# Patient Record
Sex: FEMALE | Race: ASIAN | NOT HISPANIC OR LATINO | URBAN - METROPOLITAN AREA
[De-identification: names, ages, dates, MRNs, and addresses within clinical notes are randomized per-mention and may not be internally consistent; named-entity substitution may affect disease eponyms.]

---

## 2017-06-07 ENCOUNTER — INPATIENT (INPATIENT)
Facility: HOSPITAL | Age: 77
LOS: 2 days | Discharge: HOME HEALTH | End: 2017-06-10
Attending: INTERNAL MEDICINE | Admitting: INTERNAL MEDICINE
Payer: MEDICARE

## 2017-06-07 VITALS
HEART RATE: 106 BPM | SYSTOLIC BLOOD PRESSURE: 119 MMHG | RESPIRATION RATE: 16 BRPM | DIASTOLIC BLOOD PRESSURE: 71 MMHG | OXYGEN SATURATION: 98 % | TEMPERATURE: 98 F

## 2017-06-07 DIAGNOSIS — E11.9 TYPE 2 DIABETES MELLITUS WITHOUT COMPLICATIONS: ICD-10-CM

## 2017-06-07 DIAGNOSIS — Z29.9 ENCOUNTER FOR PROPHYLACTIC MEASURES, UNSPECIFIED: ICD-10-CM

## 2017-06-07 DIAGNOSIS — D72.829 ELEVATED WHITE BLOOD CELL COUNT, UNSPECIFIED: ICD-10-CM

## 2017-06-07 DIAGNOSIS — R63.8 OTHER SYMPTOMS AND SIGNS CONCERNING FOOD AND FLUID INTAKE: ICD-10-CM

## 2017-06-07 DIAGNOSIS — I21.3 ST ELEVATION (STEMI) MYOCARDIAL INFARCTION OF UNSPECIFIED SITE: ICD-10-CM

## 2017-06-07 DIAGNOSIS — I10 ESSENTIAL (PRIMARY) HYPERTENSION: ICD-10-CM

## 2017-06-07 DIAGNOSIS — E87.1 HYPO-OSMOLALITY AND HYPONATREMIA: ICD-10-CM

## 2017-06-07 DIAGNOSIS — R74.0 NONSPECIFIC ELEVATION OF LEVELS OF TRANSAMINASE AND LACTIC ACID DEHYDROGENASE [LDH]: ICD-10-CM

## 2017-06-07 DIAGNOSIS — E78.5 HYPERLIPIDEMIA, UNSPECIFIED: ICD-10-CM

## 2017-06-07 LAB
ALBUMIN SERPL ELPH-MCNC: 4 G/DL — SIGNIFICANT CHANGE UP (ref 3.3–5)
ALP SERPL-CCNC: 81 U/L — SIGNIFICANT CHANGE UP (ref 40–120)
ALT FLD-CCNC: 26 U/L — SIGNIFICANT CHANGE UP (ref 4–33)
APTT BLD: 31.7 SEC — SIGNIFICANT CHANGE UP (ref 27.5–37.4)
APTT BLD: 55.3 SEC — HIGH (ref 27.5–37.4)
AST SERPL-CCNC: 89 U/L — HIGH (ref 4–32)
BASOPHILS # BLD AUTO: 0.02 K/UL — SIGNIFICANT CHANGE UP (ref 0–0.2)
BASOPHILS NFR BLD AUTO: 0.1 % — SIGNIFICANT CHANGE UP (ref 0–2)
BILIRUB SERPL-MCNC: 0.7 MG/DL — SIGNIFICANT CHANGE UP (ref 0.2–1.2)
BUN SERPL-MCNC: 15 MG/DL — SIGNIFICANT CHANGE UP (ref 7–23)
CALCIUM SERPL-MCNC: 9 MG/DL — SIGNIFICANT CHANGE UP (ref 8.4–10.5)
CHLORIDE SERPL-SCNC: 89 MMOL/L — LOW (ref 98–107)
CK MB BLD-MCNC: 41.34 NG/ML — HIGH (ref 1–4.7)
CK MB BLD-MCNC: 6.5 — HIGH (ref 0–2.5)
CK SERPL-CCNC: 465 U/L — HIGH (ref 25–170)
CK SERPL-CCNC: 635 U/L — HIGH (ref 25–170)
CO2 SERPL-SCNC: 20 MMOL/L — LOW (ref 22–31)
CREAT SERPL-MCNC: 0.99 MG/DL — SIGNIFICANT CHANGE UP (ref 0.5–1.3)
EOSINOPHIL # BLD AUTO: 0 K/UL — SIGNIFICANT CHANGE UP (ref 0–0.5)
EOSINOPHIL NFR BLD AUTO: 0 % — SIGNIFICANT CHANGE UP (ref 0–6)
GLUCOSE SERPL-MCNC: 244 MG/DL — HIGH (ref 70–99)
HBA1C BLD-MCNC: 6.6 % — HIGH (ref 4–5.6)
HCT VFR BLD CALC: 41.8 % — SIGNIFICANT CHANGE UP (ref 34.5–45)
HGB BLD-MCNC: 14.4 G/DL — SIGNIFICANT CHANGE UP (ref 11.5–15.5)
IMM GRANULOCYTES NFR BLD AUTO: 0.4 % — SIGNIFICANT CHANGE UP (ref 0–1.5)
INR BLD: 0.97 — SIGNIFICANT CHANGE UP (ref 0.88–1.17)
LYMPHOCYTES # BLD AUTO: 15 % — SIGNIFICANT CHANGE UP (ref 13–44)
LYMPHOCYTES # BLD AUTO: 2.89 K/UL — SIGNIFICANT CHANGE UP (ref 1–3.3)
MCHC RBC-ENTMCNC: 31.4 PG — SIGNIFICANT CHANGE UP (ref 27–34)
MCHC RBC-ENTMCNC: 34.4 % — SIGNIFICANT CHANGE UP (ref 32–36)
MCV RBC AUTO: 91.1 FL — SIGNIFICANT CHANGE UP (ref 80–100)
MONOCYTES # BLD AUTO: 1.3 K/UL — HIGH (ref 0–0.9)
MONOCYTES NFR BLD AUTO: 6.8 % — SIGNIFICANT CHANGE UP (ref 2–14)
NEUTROPHILS # BLD AUTO: 14.94 K/UL — HIGH (ref 1.8–7.4)
NEUTROPHILS NFR BLD AUTO: 77.7 % — HIGH (ref 43–77)
NT-PROBNP SERPL-SCNC: 8451 PG/ML — SIGNIFICANT CHANGE UP
PLATELET # BLD AUTO: 324 K/UL — SIGNIFICANT CHANGE UP (ref 150–400)
PMV BLD: 8.8 FL — SIGNIFICANT CHANGE UP (ref 7–13)
POTASSIUM SERPL-MCNC: 3.6 MMOL/L — SIGNIFICANT CHANGE UP (ref 3.5–5.3)
POTASSIUM SERPL-SCNC: 3.6 MMOL/L — SIGNIFICANT CHANGE UP (ref 3.5–5.3)
PROT SERPL-MCNC: 7.7 G/DL — SIGNIFICANT CHANGE UP (ref 6–8.3)
PROTHROM AB SERPL-ACNC: 10.9 SEC — SIGNIFICANT CHANGE UP (ref 9.8–13.1)
RBC # BLD: 4.59 M/UL — SIGNIFICANT CHANGE UP (ref 3.8–5.2)
RBC # FLD: 12.7 % — SIGNIFICANT CHANGE UP (ref 10.3–14.5)
SODIUM SERPL-SCNC: 129 MMOL/L — LOW (ref 135–145)
TROPONIN T SERPL-MCNC: 1.06 NG/ML — HIGH (ref 0–0.06)
TROPONIN T SERPL-MCNC: 1.46 NG/ML — HIGH (ref 0–0.06)
TSH SERPL-MCNC: 2.72 UIU/ML — SIGNIFICANT CHANGE UP (ref 0.27–4.2)
WBC # BLD: 19.22 K/UL — HIGH (ref 3.8–10.5)
WBC # FLD AUTO: 19.22 K/UL — HIGH (ref 3.8–10.5)

## 2017-06-07 PROCEDURE — 93306 TTE W/DOPPLER COMPLETE: CPT | Mod: 26

## 2017-06-07 PROCEDURE — 71010: CPT | Mod: 26

## 2017-06-07 RX ORDER — HEPARIN SODIUM 5000 [USP'U]/ML
5000 INJECTION INTRAVENOUS; SUBCUTANEOUS EVERY 8 HOURS
Qty: 0 | Refills: 0 | Status: DISCONTINUED | OUTPATIENT
Start: 2017-06-07 | End: 2017-06-07

## 2017-06-07 RX ORDER — CHOLECALCIFEROL (VITAMIN D3) 125 MCG
1 CAPSULE ORAL
Qty: 0 | Refills: 0 | COMMUNITY

## 2017-06-07 RX ORDER — INSULIN LISPRO 100/ML
VIAL (ML) SUBCUTANEOUS
Qty: 0 | Refills: 0 | Status: DISCONTINUED | OUTPATIENT
Start: 2017-06-07 | End: 2017-06-10

## 2017-06-07 RX ORDER — DEXTROSE 50 % IN WATER 50 %
12.5 SYRINGE (ML) INTRAVENOUS ONCE
Qty: 0 | Refills: 0 | Status: DISCONTINUED | OUTPATIENT
Start: 2017-06-07 | End: 2017-06-10

## 2017-06-07 RX ORDER — VALSARTAN 80 MG/1
1 TABLET ORAL
Qty: 0 | Refills: 0 | COMMUNITY

## 2017-06-07 RX ORDER — TICAGRELOR 90 MG/1
180 TABLET ORAL ONCE
Qty: 0 | Refills: 0 | Status: COMPLETED | OUTPATIENT
Start: 2017-06-07 | End: 2017-06-07

## 2017-06-07 RX ORDER — DEXTROSE 50 % IN WATER 50 %
25 SYRINGE (ML) INTRAVENOUS ONCE
Qty: 0 | Refills: 0 | Status: DISCONTINUED | OUTPATIENT
Start: 2017-06-07 | End: 2017-06-10

## 2017-06-07 RX ORDER — METFORMIN HYDROCHLORIDE 850 MG/1
1 TABLET ORAL
Qty: 0 | Refills: 0 | COMMUNITY

## 2017-06-07 RX ORDER — ASPIRIN/CALCIUM CARB/MAGNESIUM 324 MG
325 TABLET ORAL ONCE
Qty: 0 | Refills: 0 | Status: COMPLETED | OUTPATIENT
Start: 2017-06-07 | End: 2017-06-07

## 2017-06-07 RX ORDER — ASPIRIN/CALCIUM CARB/MAGNESIUM 324 MG
81 TABLET ORAL DAILY
Qty: 0 | Refills: 0 | Status: DISCONTINUED | OUTPATIENT
Start: 2017-06-08 | End: 2017-06-10

## 2017-06-07 RX ORDER — FUROSEMIDE 40 MG
20 TABLET ORAL ONCE
Qty: 0 | Refills: 0 | Status: COMPLETED | OUTPATIENT
Start: 2017-06-07 | End: 2017-06-07

## 2017-06-07 RX ORDER — TICAGRELOR 90 MG/1
90 TABLET ORAL
Qty: 0 | Refills: 0 | Status: DISCONTINUED | OUTPATIENT
Start: 2017-06-08 | End: 2017-06-08

## 2017-06-07 RX ORDER — HEPARIN SODIUM 5000 [USP'U]/ML
3000 INJECTION INTRAVENOUS; SUBCUTANEOUS ONCE
Qty: 0 | Refills: 0 | Status: COMPLETED | OUTPATIENT
Start: 2017-06-07 | End: 2017-06-07

## 2017-06-07 RX ORDER — HEPARIN SODIUM 5000 [USP'U]/ML
800 INJECTION INTRAVENOUS; SUBCUTANEOUS
Qty: 25000 | Refills: 0 | Status: DISCONTINUED | OUTPATIENT
Start: 2017-06-07 | End: 2017-06-08

## 2017-06-07 RX ORDER — INSULIN LISPRO 100/ML
VIAL (ML) SUBCUTANEOUS AT BEDTIME
Qty: 0 | Refills: 0 | Status: DISCONTINUED | OUTPATIENT
Start: 2017-06-07 | End: 2017-06-10

## 2017-06-07 RX ORDER — ATORVASTATIN CALCIUM 80 MG/1
80 TABLET, FILM COATED ORAL AT BEDTIME
Qty: 0 | Refills: 0 | Status: DISCONTINUED | OUTPATIENT
Start: 2017-06-07 | End: 2017-06-08

## 2017-06-07 RX ORDER — POTASSIUM CHLORIDE 20 MEQ
40 PACKET (EA) ORAL ONCE
Qty: 0 | Refills: 0 | Status: COMPLETED | OUTPATIENT
Start: 2017-06-07 | End: 2017-06-07

## 2017-06-07 RX ADMIN — ATORVASTATIN CALCIUM 80 MILLIGRAM(S): 80 TABLET, FILM COATED ORAL at 21:35

## 2017-06-07 RX ADMIN — Medication 40 MILLIEQUIVALENT(S): at 18:37

## 2017-06-07 RX ADMIN — Medication 5: at 16:33

## 2017-06-07 RX ADMIN — TICAGRELOR 180 MILLIGRAM(S): 90 TABLET ORAL at 14:32

## 2017-06-07 RX ADMIN — HEPARIN SODIUM 3000 UNIT(S): 5000 INJECTION INTRAVENOUS; SUBCUTANEOUS at 16:45

## 2017-06-07 RX ADMIN — Medication 20 MILLIGRAM(S): at 16:03

## 2017-06-07 RX ADMIN — HEPARIN SODIUM 8 UNIT(S)/HR: 5000 INJECTION INTRAVENOUS; SUBCUTANEOUS at 21:36

## 2017-06-07 RX ADMIN — HEPARIN SODIUM 8 UNIT(S)/HR: 5000 INJECTION INTRAVENOUS; SUBCUTANEOUS at 16:44

## 2017-06-07 RX ADMIN — Medication 325 MILLIGRAM(S): at 14:30

## 2017-06-07 NOTE — H&P ADULT - ASSESSMENT
75 y/o F with PMH NIDDM (A1C unknown), HTN, HLD referred from PCP's office for EKG changes in setting of chest pressure 2 days PTA, EKG shows new Q waves and DHAVAL in I, aVF, V2, cardiac enzymes elevated. Chest pain currently resolved and patient is hemodynamically stable. Likely late presentation myocardial infarction. 77 y/o F with PMH NIDDM (A1C 6.6%), HTN, HLD referred from PCP's office for EKG changes in setting of chest pressure 2 days PTA, EKG shows new Q waves and DHAVAL in I, aVF, V2, cardiac enzymes elevated. Chest pain currently resolved and patient is hemodynamically stable. Likely late presentation myocardial infarction.

## 2017-06-07 NOTE — H&P ADULT - PROBLEM SELECTOR PLAN 1
-Patient admitted for late presentation MI  -F/up TTE  -Plan for cardiac cath tomorrow AM.  -Monitor for recurrence of chest pain or pressure. Patient is currently without pain.  -C/w asa 81, brillinta 90 bid, lipitor 80. Will introduce ACEI and beta blocker as blood pressure tolerates. Holding for now pending cath with contrast load planned for tomorrow.   -Given elevated cardiac enzymes in setting of recent ACS, starting heparin gtt with goal PTT 60-90. Trend PTT and adjust as per protocol.   -F/up CXR  -Trend cardiac enzymes  -Trend CBC and LFTs. Currently has leukocytosis and elevated AST likely 2/2 MI  -F/up TSH, A1C, serum proBNP, lipid profile to assess for comorbidities  -Continuous cardiac monitoring for arrhythmias  -Trend VS and monitor for hemodynamic stability  -Daily EKGs

## 2017-06-07 NOTE — H&P ADULT - PROBLEM SELECTOR PLAN 3
-patient with elevated WBC 19, likely in the acute setting of recent MI. Low suspicion for infection as patient is afebrile and without leukocytosis.   -Will f/up CXR and trend VS. No abx at this time.   -Trend CBC

## 2017-06-07 NOTE — ED PROVIDER NOTE - OBJECTIVE STATEMENT
77 y/o F with h/o DM, HTN presents with intermittent left sided CP x 2 days non-radiating occurs at rest not associated with exertion. Denies SOB, palpitations, weakness. Denies fam h/o cardiac disease. No smoking hx. No recent travel, no recent illness. Appears very well.  Daily meds: Metformin, Glipizide, Volsartan, Nifedipine, Zetia  NKDA 77 y/o F with h/o DM, HTN presents with intermittent left sided CP x 2 days non-radiating occurs at rest not associated with exertion. Denies SOB, palpitations, weakness. Denies fam h/o cardiac disease. No smoking hx. No recent travel, no recent illness. Appears very well.  PMD: Carina Peres [phone].  Daily meds: Metformin, Glipizide, Volsartan, Nifedipine, Zetia  NKDA 77 y/o F with h/o DM, HTN presents with intermittent left sided CP acute onset Monday night  x 2 days non-radiating occurs at rest not associated with exertion. Has not had CP at all today. Saw PMD today who sent her into the ER. Denies SOB, palpitations, weakness. Denies fam h/o cardiac disease. No smoking hx. No recent travel, no recent illness. Appears very well. No active CP.   PMD: Carina Peres [phone].  Daily meds: Metformin, Glipizide, Volsartan, Nifedipine, Zetia  NKDA

## 2017-06-07 NOTE — ED PROVIDER NOTE - MEDICAL DECISION MAKING DETAILS
77 y/o F with h/o DM, HTN, Hyperlipidemia presenting with left sided CP intermittent at rest not associated with exertion- basic labs with Trops, cardiac monitoring, serial CE's, CDU for Stress 77 y/o F with h/o DM, HTN, Hyperlipidemia presenting with left sided CP intermittent at rest not associated with exertion, abnormal EKG upon arrival- basic labs with Trops, cardiac monitoring, serial CE's, repeat EKG ASAP 75 y/o F with h/o DM, HTN, Hyperlipidemia presenting with left sided CP intermittent at rest not associated with exertion, abnormal EKG upon arrival- basic labs with Trops, cardiac monitoring, serial CE's, repeat EKG ASAP, STEMI called

## 2017-06-07 NOTE — H&P ADULT - NSHPPHYSICALEXAM_GEN_ALL_CORE
PHYSICAL EXAM:  General: WDWN ; NAD  HEENT: NC/AT; PERRL, anicteric sclera  Neck: supple, no JVD  Respiratory: CTA B/L; no W/R/R  Cardiovascular: Tachycardic. +S1/S2 wnl. no M/R/G  Gastrointestinal: soft, NT/ND; +BS x4  Extremities: WWP; 2+ peripheral pulses B/L; trace pedal edema  Skin: normal color and turgor; no rash  Neurological: AAOx3 PHYSICAL EXAM:  General: WDWN ; NAD  HEENT: NC/AT; PERRL, anicteric sclera  Neck: supple, no JVD  Respiratory: CTA B/L; no W/R/R  Cardiovascular: Tachycardic. +S1/S2 wnl. no M/R/G , tolerates lying flat  Gastrointestinal: soft, NT/ND; +BS x4  Extremities: WWP; 2+ peripheral pulses B/L; no pedal edema  Skin: normal color and turgor; no rash  Neurological: AAOx3

## 2017-06-07 NOTE — H&P ADULT - HISTORY OF PRESENT ILLNESS
77 y/o F with PMH NIDDM, HTN, HLD referred from PCP's office for tachycardia. The patient states that on Monday, 2 days PTA, she felt chest pressure while at rest for half the day. She denies associated N/V, diaphoresis, SOB. She states that she did arm exercises and 2 baby aspirin, which slightly relieved the pain. She states that since this time she has been at her baseline state of health, including ambulating independently with a cane. Today, she had an appointment with her PCP for a routine follow up. An EKG performed in the office showed new q waves with 1mm DHAVAL in I, aVF and DHAVAL in V2.     In the ED, VS were ____________. She was given aspirin 325 and ticagrelor 180. Labs were of note for significantly elevated cardiac enzymes:   and leukocytosis:   She was admitted to the CCU for likely late presentation MI. On presentation to the CCU, the patient denied f/c, chest pain or pressure, sob, diaphoresis, N/V.          pmhx of NIDDM, HTN, HLD referred to ED From PCP office for tachycardia. Per patient, on MOnday had chest pressure while watching tv for half the day, no associated n/v/diaphoresis, did arm exercices which she said somewhat helped and took 2 aspirin pills. Today, had a routien follow up appointment with PCP, where and EKG was done and referred to ED. EKG at PCP had shown q waves with 1mm STEI in I AvF with DHAVAL in v2. States has been in good health since MOnday, walking with cane (baseline) without any dyspnea, LE edema, orthopnea, PND. NO recent traveling. 75 y/o F with PMH NIDDM (A1C unknown), HTN, HLD referred from PCP's office for tachycardia. The patient states that on Monday, 2 days PTA, she felt chest pressure while at rest for half the day. She denies associated N/V, diaphoresis, SOB. She states that she did arm exercises and 2 baby aspirin, which slightly relieved the pain. She states that since this time she has been at her baseline state of health, including ambulating independently with a cane. Today, she had an appointment with her PCP for a routine follow up. An EKG performed in the office showed new q waves with 1mm DHAVAL in I, aVF and DHAVAL in V2.     In the ED, VS were T 98.1, -110, -119/68-71, RR 16, 98% RA. She was given aspirin 325 and ticagrelor 180. Labs were of note for significantly elevated cardiac enzymes , CKMB 41.34, Trop 1.06. Also AST elevated 89, hyponatremia 126 and leukocytosis 19.22. She was admitted to the CCU for likely late presentation MI. On presentation to the CCU, the patient denied f/c, chest pain or pressure, sob, diaphoresis, N/V.          pmhx of NIDDM, HTN, HLD referred to ED From PCP office for tachycardia. Per patient, on MOnday had chest pressure while watching tv for half the day, no associated n/v/diaphoresis, did arm exercices which she said somewhat helped and took 2 aspirin pills. Today, had a routien follow up appointment with PCP, where and EKG was done and referred to ED. EKG at PCP had shown q waves with 1mm STEI in I AvF with DHAVAL in v2. States has been in good health since MOnday, walking with cane (baseline) without any dyspnea, LE edema, orthopnea, PND. NO recent traveling. 77 y/o F with PMH NIDDM (A1C 6.6%), HTN, HLD referred from PCP's office for tachycardia. The patient states that on Monday, 2 days PTA, she felt chest pressure while at rest for half the day. She denies associated N/V, diaphoresis, SOB. She states that she did arm exercises and 2 baby aspirin, which slightly relieved the pain. She states that since this time she has been at her baseline state of health, including ambulating independently with a cane. Today, she had an appointment with her PCP for a routine follow up. An EKG performed in the office showed new q waves with 1mm DHAVAL in I, aVF and DHAVAL in V2.     In the ED, VS were T 98.1, -110, -119/68-71, RR 16, 98% RA. She was given aspirin 325 and ticagrelor 180. Labs were of note for significantly elevated cardiac enzymes , CKMB 41.34, Trop 1.06. Also AST elevated 89, hyponatremia 126 and leukocytosis 19.22. She was admitted to the CCU for likely late presentation MI. On presentation to the CCU, the patient denied f/c, chest pain or pressure, sob, diaphoresis, N/V.          pmhx of NIDDM, HTN, HLD referred to ED From PCP office for tachycardia. Per patient, on MOnday had chest pressure while watching tv for half the day, no associated n/v/diaphoresis, did arm exercices which she said somewhat helped and took 2 aspirin pills. Today, had a routien follow up appointment with PCP, where and EKG was done and referred to ED. EKG at PCP had shown q waves with 1mm STEI in I AvF with DHAVAL in v2. States has been in good health since MOnday, walking with cane (baseline) without any dyspnea, LE edema, orthopnea, PND. NO recent traveling. 77 y/o F with PMH NIDDM (A1C 6.6%), HTN, HLD referred from PCP's office for tachycardia. The patient states that on Monday, 2 days PTA, she felt chest pressure while at rest for half the day. She denies associated N/V, diaphoresis, SOB. She states that she did arm exercises and 2 baby aspirin, which slightly relieved the pain. She states that since this time she has been at her baseline state of health, including ambulating independently with a cane. Today, she had an appointment with her PCP for a routine follow up. An EKG performed in the office showed new q waves with 1mm Q waves, DHAVAL in I, aVF and V2.     In the ED, VS were T 98.1, -110, -119/68-71, RR 16, 98% RA. She was given aspirin 325 and ticagrelor 180. Labs were of note for significantly elevated cardiac enzymes , CKMB 41.34, Trop 1.06. Also AST elevated 89, hyponatremia 126 and leukocytosis 19.22. She was admitted to the CCU for likely late presentation MI. On presentation to the CCU, the patient denied f/c, chest pain or pressure, sob, diaphoresis, N/V.          pmhx of NIDDM, HTN, HLD referred to ED From PCP office for tachycardia. Per patient, on MOnday had chest pressure while watching tv for half the day, no associated n/v/diaphoresis, did arm exercices which she said somewhat helped and took 2 aspirin pills. Today, had a routien follow up appointment with PCP, where and EKG was done and referred to ED. EKG at PCP had shown q waves with 1mm STEI in I AvF with DHAVAL in v2. States has been in good health since MOnday, walking with cane (baseline) without any dyspnea, LE edema, orthopnea, PND. NO recent traveling. 77 y/o F with PMH NIDDM (A1C 6.6%), HTN, HLD referred from PCP's office for tachycardia. The patient states that on Monday, 2 days PTA, she felt chest pressure while at rest for half the day. She denies associated N/V, diaphoresis, SOB. She states that she did arm exercises and 2 baby aspirin, which slightly relieved the pain. She states that since this time she has been at her baseline state of health, including ambulating independently with a cane. Today, she had an appointment with her PCP for a routine follow up. An EKG performed in the office showed new q waves with 1mm Q waves, DHAVAL in I, aVF and V2.     In the ED, VS were T 98.1, -110, -119/68-71, RR 16, 98% RA. She was given aspirin 325, ticagrelor 180, and lasix 20 IVP x 1. Labs were of note for significantly elevated cardiac enzymes , CKMB 41.34, Trop 1.06. Also AST elevated 89, hyponatremia 126 and leukocytosis 19.22. She was admitted to the CCU for likely late presentation MI. On presentation to the CCU, the patient denied f/c, chest pain or pressure, sob, diaphoresis, N/V.          pmhx of NIDDM, HTN, HLD referred to ED From PCP office for tachycardia. Per patient, on MOnday had chest pressure while watching tv for half the day, no associated n/v/diaphoresis, did arm exercices which she said somewhat helped and took 2 aspirin pills. Today, had a routien follow up appointment with PCP, where and EKG was done and referred to ED. EKG at PCP had shown q waves with 1mm STEI in I AvF with DHAVAL in v2. States has been in good health since MOnday, walking with cane (baseline) without any dyspnea, LE edema, orthopnea, PND. NO recent traveling.

## 2017-06-07 NOTE — H&P ADULT - FAMILY HISTORY
Mother  Still living? Unknown  Family history of coronary artery disease in mother, Age at diagnosis: Age Unknown

## 2017-06-07 NOTE — H&P ADULT - PROBLEM SELECTOR PLAN 5
-serum Na 126, will continue to trend. Likely 2/2 acute stress state, will continue to trend. Will obtain urine lytes/serum osmolality if hyponatremia persists.

## 2017-06-07 NOTE — ED PROVIDER NOTE - ATTENDING CONTRIBUTION TO CARE
Dr. Patterson: 76F h/o DM, HTN presents for evaluation after an episode of CP two days ago.  Patient reports R sided chest pain, now resolved. No pain on exertion, no radiation, no associated symptoms.  No current CP, no SOB.  No cough or fever.  On exam well appearing, nad, lungs clear, rrr, abd soft, 2+ pulses, no edema, no rash, no focal neuro deficits.  EKG + DHAVAL I, aVL, V2.  STEMI c/s called, given asa and brillinta.  Since no CP currently, plan for admission to [facility] for urgent cath.

## 2017-06-07 NOTE — H&P ADULT - PROBLEM SELECTOR PLAN 6
-Patient has hypertension at home, now normotensive   -Holding home BP medications in setting of recent MI  -Trend VS.   -Introduce ACEI and beta blocker as patient tolerates

## 2017-06-07 NOTE — H&P ADULT - PROBLEM SELECTOR PLAN 2
-T2DM on oral hypoglycemics, unknown A1C  -F/up hemoglobin A1C  -C/w low ISS, trend FS  -Consistent carb diet -Controlled T2DM on oral hypoglycemics, A1C 6.6%  -C/w low ISS, trend FS  -Consistent carb diet

## 2017-06-07 NOTE — ED ADULT NURSE NOTE - OBJECTIVE STATEMENT
pt received spot 2. pt A+Ox3 c/o intermittent non radiating cp x2 days. pt was seen at PMD office today and sent to ED for Eval. pt denies pain at this time. respirations even and unlabored. pt placed on CM. vs as noted. labs sent. IVSL in place. awaiting further orders. will monitor.

## 2017-06-07 NOTE — H&P ADULT - NSHPLABSRESULTS_GEN_ALL_CORE
LABS:                        14.4   19.22 )-----------( 324      ( 07 Jun 2017 13:30 )             41.8     06-07    129<L>  |  89<L>  |  15  ----------------------------<  244<H>  3.6   |  20<L>  |  0.99    Ca    9.0      07 Jun 2017 13:30    TPro  7.7  /  Alb  4.0  /  TBili  0.7  /  DBili  x   /  AST  89<H>  /  ALT  26  /  AlkPhos  81  06-07    LIVER FUNCTIONS - ( 07 Jun 2017 13:30 )  Alb: 4.0 g/dL / Pro: 7.7 g/dL / ALK PHOS: 81 u/L / ALT: 26 u/L / AST: 89 u/L / GGT: x           PT/INR - ( 07 Jun 2017 13:30 )   PT: 10.9 SEC;   INR: 0.97       PTT - ( 07 Jun 2017 13:30 )  PTT:31.7 SEC    CARDIAC MARKERS ( 07 Jun 2017 13:30 )  x     / 1.06 ng/mL / 635 u/L / 41.34 ng/mL / x

## 2017-06-07 NOTE — CONSULT NOTE ADULT - SUBJECTIVE AND OBJECTIVE BOX
CHIEF COMPLAINT: concern for STEMI    HISTORY OF PRESENT ILLNESS:  76F pmhx of NIDDM, HTN, HLD referred to ED From PCP office for tachycardia. Per patient, on MOnday had chest pressure while watching tv for half the day, no associated n/v/diaphoresis, did arm exercices which she said somewhat helped and took 2 aspirin pills. Today, had a routien follow up appointment with PCP, where and EKG was done and referred to ED. EKG at PCP had shown q waves with 1mm STEI in I AvF with DHAVAL in v2. States has been in good health since MOnday, walking with cane (baseline) without any dyspnea, LE edema, orthopnea, PND. NO recent traveling.     non smoker, mom  of "heart disease" at 60s  home meds are  metformin  glipizide  nifedipine 30  valsartan 60  a HLD medication    Allergies    No Known Allergies    Intolerances    	    MEDICATIONS:  ticagrelor 180milliGRAM(s) Oral once        aspirin 325milliGRAM(s) Oral Once            PAST MEDICAL & SURGICAL HISTORY:  Hyperlipidemia  HTN (hypertension)  Diabetes      FAMILY HISTORY:  No pertinent family history in first degree relatives      SOCIAL HISTORY:    [x ] Non-smoker      REVIEW OF SYSTEMS:  See HPI. Otherwise, 10 point ROS done and otherwise negative.    PHYSICAL EXAM:  T(C): 36.7, Max: 36.7 (06-07 @ 12:59)  HR: 110 (106 - 110)  BP: 112/68 (112/68 - 119/71)  RR: 15 (15 - 16)  SpO2: 99% (98% - 99%)  Wt(kg): --  I&O's Summary      Appearance: Normal	  HEENT:   Normal oral mucosa, PERRL, EOMI	  Lymphatic: No lymphadenopathy  Cardiovascular: Normal S1 S2, No JVD, No murmurs, No edema  Respiratory: Lungs clear to auscultation	  Psychiatry: A & O x 3, Mood & affect appropriate  Gastrointestinal:  Soft, Non-tender, + BS	  Skin: No rashes, No ecchymoses, No cyanosis	  Neurologic: Non-focal  Extremities: Normal range of motion, No clubbing, cyanosis or edema  Vascular: Peripheral pulses palpable 2+ bilaterally        LABS:	 	    CBC Full  -  ( 2017 13:30 )  WBC Count : 19.22 K/uL  Hemoglobin : 14.4 g/dL  Hematocrit : 41.8 %  Platelet Count - Automated : 324 K/uL  Mean Cell Volume : 91.1 fL  Mean Cell Hemoglobin : 31.4 pg  Mean Cell Hemoglobin Concentration : 34.4 %  Auto Neutrophil # : 14.94 K/uL  Auto Lymphocyte # : 2.89 K/uL  Auto Monocyte # : 1.30 K/uL  Auto Eosinophil # : 0.00 K/uL  Auto Basophil # : 0.02 K/uL  Auto Neutrophil % : 77.7 %  Auto Lymphocyte % : 15.0 %  Auto Monocyte % : 6.8 %  Auto Eosinophil % : 0.0 %  Auto Basophil % : 0.1 %      	  ASSESSMENT/PLAN: 	    76f PMHX of NIDDM, HLD, HTN referred to ED by PCP, found to have on ekg 1mm Q wave DHAVAL in I, AVL likely old missed infarct.    #Q wave MI likely missed MI on Monday given clinical story of chest pressure for half day on MOnday and currently without any pain. Given missed MI (likely Lcx territory given EKG distribution), per OAT trial, and in no chest pain and not in heart failure on exam, no indication for emergent catherizawtion. Will follow up with troponin, CKMB, CPK and Cr.  - will treat for CAD with brilinta 180mg loading and asa 324 now with asa 81qd and brilinta 90 bid. patient has insurance.  - not in heart failure by exam, please start po metoprolol tartrate 25mg bid, if patient starts developign dyspnea, LE edema, pulm edema please d/c.  - no need for heparin at this time if not in chest pain as not in active ACS  - if Cr wnl, will coordinate for cath in AM. may benefit from viability study afterwards  - start atorvastatin 80mg qd    #niddm check a1c, lipid panel  - low dose sliding scale for now    #sqh    admit to tele pa, under cardiology service    [place] [id number]

## 2017-06-07 NOTE — ED ADULT TRIAGE NOTE - CHIEF COMPLAINT QUOTE
Pt c/o chest pain starting 2 days ago, none at present. Pt reports earlier dizziness denies SOB, chest pain. Resp even and unlabored. No distress.   .

## 2017-06-07 NOTE — ED PROVIDER NOTE - PROGRESS NOTE DETAILS
FILEMON Doty- Repeat EKG - possible septal infarct/acute stemi-  Stemi consult called FILEMON Doty- Aspirin administered. Cards fellow consulted. Recommends [facility]. Debating whether or not to admit for cath today vs tomorrow. Getting back to me on who to admit to. FILEMON Doty- call from lab- Trop 1.06.  Pt already admitted to Tele/Dr. Urena as per cardio fellow at 1420. Cardio fellow made aware of positive trop.

## 2017-06-08 LAB
ALBUMIN SERPL ELPH-MCNC: 3.7 G/DL — SIGNIFICANT CHANGE UP (ref 3.3–5)
ALP SERPL-CCNC: 62 U/L — SIGNIFICANT CHANGE UP (ref 40–120)
ALT FLD-CCNC: 20 U/L — SIGNIFICANT CHANGE UP (ref 4–33)
APTT BLD: 56.4 SEC — HIGH (ref 27.5–37.4)
AST SERPL-CCNC: 60 U/L — HIGH (ref 4–32)
BILIRUB SERPL-MCNC: 0.8 MG/DL — SIGNIFICANT CHANGE UP (ref 0.2–1.2)
BUN SERPL-MCNC: 15 MG/DL — SIGNIFICANT CHANGE UP (ref 7–23)
CALCIUM SERPL-MCNC: 9.1 MG/DL — SIGNIFICANT CHANGE UP (ref 8.4–10.5)
CHLORIDE SERPL-SCNC: 92 MMOL/L — LOW (ref 98–107)
CHOLEST SERPL-MCNC: 202 MG/DL — HIGH (ref 120–199)
CK MB BLD-MCNC: 18.37 NG/ML — HIGH (ref 1–4.7)
CK SERPL-CCNC: 387 U/L — HIGH (ref 25–170)
CO2 SERPL-SCNC: 23 MMOL/L — SIGNIFICANT CHANGE UP (ref 22–31)
CREAT SERPL-MCNC: 0.88 MG/DL — SIGNIFICANT CHANGE UP (ref 0.5–1.3)
GLUCOSE SERPL-MCNC: 175 MG/DL — HIGH (ref 70–99)
HCT VFR BLD CALC: 40.1 % — SIGNIFICANT CHANGE UP (ref 34.5–45)
HDLC SERPL-MCNC: 93 MG/DL — HIGH (ref 45–65)
HGB BLD-MCNC: 13.7 G/DL — SIGNIFICANT CHANGE UP (ref 11.5–15.5)
LIPID PNL WITH DIRECT LDL SERPL: 105 MG/DL — SIGNIFICANT CHANGE UP
MAGNESIUM SERPL-MCNC: 1.6 MG/DL — SIGNIFICANT CHANGE UP (ref 1.6–2.6)
MCHC RBC-ENTMCNC: 31 PG — SIGNIFICANT CHANGE UP (ref 27–34)
MCHC RBC-ENTMCNC: 34.2 % — SIGNIFICANT CHANGE UP (ref 32–36)
MCV RBC AUTO: 90.7 FL — SIGNIFICANT CHANGE UP (ref 80–100)
PHOSPHATE SERPL-MCNC: 2.7 MG/DL — SIGNIFICANT CHANGE UP (ref 2.5–4.5)
PLATELET # BLD AUTO: 321 K/UL — SIGNIFICANT CHANGE UP (ref 150–400)
PMV BLD: 9.1 FL — SIGNIFICANT CHANGE UP (ref 7–13)
POTASSIUM SERPL-MCNC: 3.8 MMOL/L — SIGNIFICANT CHANGE UP (ref 3.5–5.3)
POTASSIUM SERPL-SCNC: 3.8 MMOL/L — SIGNIFICANT CHANGE UP (ref 3.5–5.3)
PROT SERPL-MCNC: 7.1 G/DL — SIGNIFICANT CHANGE UP (ref 6–8.3)
RBC # BLD: 4.42 M/UL — SIGNIFICANT CHANGE UP (ref 3.8–5.2)
RBC # FLD: 12.7 % — SIGNIFICANT CHANGE UP (ref 10.3–14.5)
SODIUM SERPL-SCNC: 131 MMOL/L — LOW (ref 135–145)
TRIGL SERPL-MCNC: 74 MG/DL — SIGNIFICANT CHANGE UP (ref 10–149)
TROPONIN T SERPL-MCNC: 1.52 NG/ML — HIGH (ref 0–0.06)
TROPONIN T SERPL-MCNC: 1.57 NG/ML — HIGH (ref 0–0.06)
WBC # BLD: 17.61 K/UL — HIGH (ref 3.8–10.5)
WBC # FLD AUTO: 17.61 K/UL — HIGH (ref 3.8–10.5)

## 2017-06-08 PROCEDURE — 93458 L HRT ARTERY/VENTRICLE ANGIO: CPT | Mod: 26

## 2017-06-08 PROCEDURE — 99223 1ST HOSP IP/OBS HIGH 75: CPT

## 2017-06-08 PROCEDURE — 93010 ELECTROCARDIOGRAM REPORT: CPT

## 2017-06-08 RX ORDER — CLOPIDOGREL BISULFATE 75 MG/1
75 TABLET, FILM COATED ORAL DAILY
Qty: 0 | Refills: 0 | Status: DISCONTINUED | OUTPATIENT
Start: 2017-06-09 | End: 2017-06-10

## 2017-06-08 RX ORDER — CARVEDILOL PHOSPHATE 80 MG/1
3.12 CAPSULE, EXTENDED RELEASE ORAL EVERY 12 HOURS
Qty: 0 | Refills: 0 | Status: DISCONTINUED | OUTPATIENT
Start: 2017-06-08 | End: 2017-06-10

## 2017-06-08 RX ORDER — VALSARTAN 80 MG/1
160 TABLET ORAL DAILY
Qty: 0 | Refills: 0 | Status: DISCONTINUED | OUTPATIENT
Start: 2017-06-08 | End: 2017-06-10

## 2017-06-08 RX ORDER — CLOPIDOGREL BISULFATE 75 MG/1
75 TABLET, FILM COATED ORAL DAILY
Qty: 0 | Refills: 0 | Status: DISCONTINUED | OUTPATIENT
Start: 2017-06-08 | End: 2017-06-08

## 2017-06-08 RX ORDER — POTASSIUM CHLORIDE 20 MEQ
20 PACKET (EA) ORAL ONCE
Qty: 0 | Refills: 0 | Status: COMPLETED | OUTPATIENT
Start: 2017-06-08 | End: 2017-06-08

## 2017-06-08 RX ORDER — CARVEDILOL PHOSPHATE 80 MG/1
3.12 CAPSULE, EXTENDED RELEASE ORAL EVERY 12 HOURS
Qty: 0 | Refills: 0 | Status: DISCONTINUED | OUTPATIENT
Start: 2017-06-08 | End: 2017-06-08

## 2017-06-08 RX ADMIN — TICAGRELOR 90 MILLIGRAM(S): 90 TABLET ORAL at 06:05

## 2017-06-08 RX ADMIN — VALSARTAN 160 MILLIGRAM(S): 80 TABLET ORAL at 14:35

## 2017-06-08 RX ADMIN — Medication 80 MILLIGRAM(S): at 22:24

## 2017-06-08 RX ADMIN — Medication 81 MILLIGRAM(S): at 09:32

## 2017-06-08 RX ADMIN — Medication 1: at 18:09

## 2017-06-08 RX ADMIN — CARVEDILOL PHOSPHATE 3.12 MILLIGRAM(S): 80 CAPSULE, EXTENDED RELEASE ORAL at 18:13

## 2017-06-08 RX ADMIN — Medication 20 MILLIEQUIVALENT(S): at 09:32

## 2017-06-08 RX ADMIN — Medication 2: at 13:22

## 2017-06-08 RX ADMIN — Medication 1: at 09:31

## 2017-06-08 NOTE — PROGRESS NOTE ADULT - PROBLEM SELECTOR PLAN 1
-Patient admitted for late presentation MI  -F/up TTE  -Plan for cardiac cath tomorrow AM.  -Monitor for recurrence of chest pain or pressure. Patient is currently without pain.  -C/w asa 81, brillinta 90 bid, lipitor 80. Will introduce ACEI and beta blocker as blood pressure tolerates. Holding for now pending cath with contrast load planned for tomorrow.   -Given elevated cardiac enzymes in setting of recent ACS, starting heparin gtt with goal PTT 60-90. Trend PTT and adjust as per protocol.   -F/up CXR  -Trend cardiac enzymes  -Trend CBC and LFTs. Currently has leukocytosis and elevated AST likely 2/2 MI  -F/up TSH, A1C, serum proBNP, lipid profile to assess for comorbidities  -Continuous cardiac monitoring for arrhythmias  -Trend VS and monitor for hemodynamic stability  -Daily EKGs --pt found to have new Q waves and ST elevations in 1, avF and V2 on EKG at PCP's office after having experienced CP 2 days prior  --Pauline significantly elevated on admission, consistent with likely delayed presentation STEMI   --s/p ASA, Brillinta load, also on heparin gtt. Not taken for urgent cardiac cath as patient was asymptomatic  --plan for cardiac cath today  --continue ASA 81mg qd, Brilinta 90mg PO BID, Lipitor 80mg qd. Will continue heparin gtt until post-cath  -Monitor for recurrence of chest pain or pressure. Nitroglycerin sublingual PRN, will expedite cath if patient becomes symptomatic   --Start Lopressor 12.5mg PO BID and uptitrate as tolerated.   --Patient will need ACEI also to complete ACS protocol. Holding for now pending cath with contrast load planned for tomorrow.   --CKs downtrending, troponins not yet peaked. Will continue trending troponin uuntil downtrended  --TSH wnl, ha1c 6.6%, pro-BNP 8000  --lipid panel notable for elevated total cholesterol, otherwise unremarkable   --continuous cardiac monitoring for arrhythmias --pt found to have new Q waves and ST elevations in 1, avF and V2 on EKG at PCP's office after having experienced CP 2 days prior  --Pauline significantly elevated on admission, consistent with likely delayed presentation STEMI   --s/p ASA, Brillinta load, also on heparin gtt. Not taken for urgent cardiac cath as patient was asymptomatic  --plan for cardiac cath today  --continue ASA 81mg qd, Brilinta 90mg PO BID, Lipitor 80mg qd. Will continue heparin gtt until post-cath  --Monitor for recurrence of chest pain or pressure. Nitroglycerin sublingual PRN, will expedite cath if patient becomes symptomatic   --Start Coreg 3.125mg after cath and uptitrate as toleratedas pt with severe global LV systolic dysfunction on TTE  --Patient will need ACEI also to complete ACS protocol. Holding for now pending cath with contrast load planned for today  --CKs downtrending, troponins not yet peaked. Will continue trending troponin through peak  --TSH wnl, ha1c 6.6%, pro-BNP 8000  --lipid panel notable for elevated total cholesterol, otherwise unremarkable   --continuous cardiac monitoring for arrhythmias

## 2017-06-08 NOTE — PROGRESS NOTE ADULT - PROBLEM SELECTOR PLAN 5
-serum Na 126, will continue to trend. Likely 2/2 acute stress state, will continue to trend. Will obtain urine lytes/serum osmolality if hyponatremia persists. --serum Na 129 on admission, improved to 131 this AM  --euvolemic on exam, most likely SIADH in setting of acute stress state as patient improving without acute intervention  --continue to trend with qd BMP. If Na plateaus or worsens will send urine Na, urine/serum osms and serum uric acid to determine etiology

## 2017-06-08 NOTE — PROGRESS NOTE ADULT - PROBLEM SELECTOR PLAN 2
-Controlled T2DM on oral hypoglycemics, A1C 6.6%  -C/w low ISS, trend FS  -Consistent carb diet -controlled T2DM on oral hypoglycemics, A1C 6.6%  -c/w low ISS, trend FS. Will add basal/bolus PRN

## 2017-06-08 NOTE — PROGRESS NOTE ADULT - PROBLEM SELECTOR PLAN 4
-AST elevated likely 2/2 recent cardiac ischemia  -continue to trend LFTs --improving  --AST elevated likely 2/2 recent cardiac ischemia especially in light of normal ALT  --trend hepatic function panel daily

## 2017-06-08 NOTE — PROGRESS NOTE ADULT - PROBLEM SELECTOR PLAN 6
-Patient has hypertension at home, now normotensive   -Holding home BP medications in setting of recent MI  -Trend VS.   -Introduce ACEI and beta blocker as patient tolerates --holding home BP medications in setting of recent MI, remains normotensive   --start Bblocker today as above, will institute ACEI as tolerated

## 2017-06-08 NOTE — PROGRESS NOTE ADULT - SUBJECTIVE AND OBJECTIVE BOX
CCU Progress Note    S/24 Events: Admitted to CCU yesterday evening. As a review, two days PTA pt felt chest pressure while at rest for half the day which was relived slightly by ASA 81mg x 2. On day of admission (yesterday) she attended routine PCP f/u where EKG showed new q waves with 1mm Q waves, DHAVAL in I, aVF and V2, prompting rec to come to ER. In ER, -110, VS otherwise stable. She was given aspirin 325mg, ticagrelor 180mg, and lasix 20 IVP x 1. She had significantly elevated cardiac enzymes (, CKMB 41.34, Trop 1.06). Also AST elevated 89, hyponatremia 126 and leukocytosis 19.22. She was not sent for cath as she was noted to be asymptomartic, chest pain free. She was admitted to the CCU for likely late presentation MI and started on heparin gtt for persistently elevated Pauline with plan for likely cardiac cath today. This AM she feels well without complaint. Continues to deny CP, no SOB/DIAL. No epigastric pain, N/V.     O:  T(C): 36.8, Max: 37 ( @ 16:15)  HR: 92 (92 - 113)  BP: 110/60 (90/71 - 132/88)  RR: 21 (0 - 32)  SpO2: 98% (98% - 100%)  Wt(kg): --  Daily Height in cm: 160.02 (2017 16:15)    Daily Weight in k.7 (2017 06:00)    I & Os for current day (as of  @ 08:01)  =============================================  IN: 520 ml / OUT: 500 ml / NET: 20 ml      General: alert, NAD, non-toxic, pleasant HEENT: clear conjunctiva, no oral lesions, MMM Respiratory: CTAB, no c/w/r Cardiovascular: RRR. +S1/S2 wnl. no m/r/g , no JVD Gastrointestinal: soft, non-distended, non-tender, NABS Neuro: AOX3, grossly non-focal Extremities: no peripheral edema 	  Labs:                              13.7   (19) 17.61 )-----------( 321      ( 2017 05:35 )                    40.1     06-08    (129)  131<L>  |  92<L>  |  15  ----------------------------<  175<H>  3.8   |  23  |  0.88    Ca    9.1      2017 05:35  Phos  2.7     06-08                                                                               (89)  TPro  7.1  /  Alb  3.7  /  TBili  0.8  /  DBili  x   /  AST  60<H>  /  ALT  20  /  AlkPhos  62  06-08    PT/INR - ( 2017 13:30 )   PT: 10.9 SEC;   INR: 0.97          PTT - ( 2017 05:35 )  PTT:56.4 SEC  CARDIAC MARKERS ( 2017 05:35 )  x     / 1.57 ng/mL / 387 u/L / 18.37 ng/mL / x      CARDIAC MARKERS ( 2017 22:00 )  x     / 1.46 ng/mL / 465 u/L / x     / x      CARDIAC MARKERS ( 2017 13:30 )  x     / 1.06 ng/mL / 635 u/L / 41.34 ng/mL / x              Meds:  MEDICATIONS  (STANDING):  aspirin enteric coated 81milliGRAM(s) Oral daily  ticagrelor 90milliGRAM(s) Oral two times a day  atorvastatin 80milliGRAM(s) Oral at bedtime  insulin lispro (HumaLOG) corrective regimen sliding scale  SubCutaneous three times a day before meals  insulin lispro (HumaLOG) corrective regimen sliding scale  SubCutaneous at bedtime  dextrose 50% Injectable 12.5Gram(s) IV Push once  dextrose 50% Injectable 25Gram(s) IV Push once  dextrose 50% Injectable 25Gram(s) IV Push once  heparin  Infusion 800Unit(s)/Hr IV Continuous <Continuous>  potassium chloride    Tablet ER 20milliEquivalent(s) Oral once          Adrianna Rodriguez M.D. PGY-1 CCU Progress Note    S/24 Events: Admitted to CCU yesterday evening. As a review, two days PTA pt felt chest pressure while at rest for half the day which was relived slightly by ASA 81mg x 2. On day of admission (yesterday) she attended routine PCP f/u where EKG showed new q waves with 1mm Q waves, DHAVAL in I, aVL and V2, prompting rec to come to ER. In ER, -110, VS otherwise stable. She was given aspirin 325mg, ticagrelor 180mg, and lasix 20 IVP x 1. She had significantly elevated cardiac enzymes (, CKMB 41.34, Trop 1.06). Also AST elevated 89, hyponatremia 126 and leukocytosis 19.22. She was not sent for cath as she was noted to be asymptomartic, chest pain free. She was admitted to the CCU for likely late presentation MI and started on heparin gtt for persistently elevated Pauline with plan for likely cardiac cath today. This AM she feels well without complaint. Continues to deny CP, no SOB/DIAL. No epigastric pain, N/V.     O:  T(C): 36.8, Max: 37 ( @ 16:15)  HR: 92 (92 - 113)  BP: 110/60 (90/71 - 132/88)  RR: 21 (0 - 32)  SpO2: 98% (98% - 100%)  Wt(kg): --  Daily Height in cm: 160.02 (2017 16:15)    Daily Weight in k.7 (2017 06:00)    I & Os for current day (as of  @ 08:01)  =============================================  IN: 520 ml / OUT: 500 ml / NET: 20 ml      General: alert, NAD, non-toxic, pleasant HEENT: clear conjunctiva, no oral lesions, MMM Respiratory: CTAB, no c/w/r Cardiovascular: RRR. +S1/S2 wnl. no m/r/g , no JVD Gastrointestinal: soft, non-distended, non-tender, NABS Neuro: AOX3, grossly non-focal Extremities: no peripheral edema 	  Labs:                              13.7   (19) 17.61 )-----------( 321      ( 2017 05:35 )                    40.1     06-08    (129)  131<L>  |  92<L>  |  15  ----------------------------<  175<H>  3.8   |  23  |  0.88    Ca    9.1      2017 05:35  Phos  2.7     -08                                                                               (89)  TPro  7.1  /  Alb  3.7  /  TBili  0.8  /  DBili  x   /  AST  60<H>  /  ALT  20  /  AlkPhos  62  06-08    PT/INR - ( 2017 13:30 )   PT: 10.9 SEC;   INR: 0.97          PTT - ( 2017 05:35 )  PTT:56.4 SEC  CARDIAC MARKERS ( 2017 05:35 )  x     / 1.57 ng/mL / 387 u/L / 18.37 ng/mL / x      CARDIAC MARKERS ( 2017 22:00 )  x     / 1.46 ng/mL / 465 u/L / x     / x     Echo:  1. Mitral annular calcification, otherwise normal mitral  valve. Minimal mitral regurgitation.  2. Severe segmental left ventricular systolic dysfunction.  Hypokinesis of the apex, mid to distal septum, and mid to  distal anterior wall.  Endocardial visualization enhanced  with intravenous injection of echo contrast (Definity).  No  LV thrombus seen.  3. The right ventricle is not well visualized; grossly  normal right ventricular systolic function.     CARDIAC MARKERS ( 2017 13:30 )  x     / 1.06 ng/mL / 635 u/L / 41.34 ng/mL / x              Meds:  MEDICATIONS  (STANDING):  aspirin enteric coated 81milliGRAM(s) Oral daily  ticagrelor 90milliGRAM(s) Oral two times a day  atorvastatin 80milliGRAM(s) Oral at bedtime  insulin lispro (HumaLOG) corrective regimen sliding scale  SubCutaneous three times a day before meals  insulin lispro (HumaLOG) corrective regimen sliding scale  SubCutaneous at bedtime  dextrose 50% Injectable 12.5Gram(s) IV Push once  dextrose 50% Injectable 25Gram(s) IV Push once  dextrose 50% Injectable 25Gram(s) IV Push once  heparin  Infusion 800Unit(s)/Hr IV Continuous <Continuous>  potassium chloride    Tablet ER 20milliEquivalent(s) Oral once          Adrianna Rodriguez M.D. PGY-1

## 2017-06-08 NOTE — PROGRESS NOTE ADULT - ASSESSMENT
77 y/o F with PMH NIDDM (A1C 6.6%), HTN, HLD referred from PCP's office for new Q waves and DHAVAL in I, aVF, V2 on EKG, found to have elevated cardiac enzymes on admission but chest pain free, admitted to CCU for delayed presentation STEMI 75 y/o F with PMH NIDDM (A1C 6.6%), HTN, HLD referred from PCP's office for new Q waves and DHAVAL in I, aVL, V2 on EKG, found to have elevated cardiac enzymes on admission but chest pain free, admitted to CCU for delayed presentation STEMI

## 2017-06-08 NOTE — PROGRESS NOTE ADULT - PROBLEM SELECTOR PLAN 3
-patient with elevated WBC 19, likely in the acute setting of recent MI. Low suspicion for infection as patient is afebrile and without leukocytosis.   -Will f/up CXR and trend VS. No abx at this time.   -Trend CBC --patient with elevated WBC 19, improved to 17 without intervention this AM.  --likely in the acute setting of recent MI. Low suspicion for infection as patient afebrile without leukocytosis, no clinical symptoms of infectious source  --monitoring off abx, trending with qd CBC

## 2017-06-09 DIAGNOSIS — I42.8 OTHER CARDIOMYOPATHIES: ICD-10-CM

## 2017-06-09 LAB
BUN SERPL-MCNC: 25 MG/DL — HIGH (ref 7–23)
CALCIUM SERPL-MCNC: 9 MG/DL — SIGNIFICANT CHANGE UP (ref 8.4–10.5)
CHLORIDE SERPL-SCNC: 97 MMOL/L — LOW (ref 98–107)
CO2 SERPL-SCNC: 22 MMOL/L — SIGNIFICANT CHANGE UP (ref 22–31)
CREAT SERPL-MCNC: 0.84 MG/DL — SIGNIFICANT CHANGE UP (ref 0.5–1.3)
GLUCOSE SERPL-MCNC: 140 MG/DL — HIGH (ref 70–99)
HCT VFR BLD CALC: 37.7 % — SIGNIFICANT CHANGE UP (ref 34.5–45)
HGB BLD-MCNC: 12.5 G/DL — SIGNIFICANT CHANGE UP (ref 11.5–15.5)
MAGNESIUM SERPL-MCNC: 1.8 MG/DL — SIGNIFICANT CHANGE UP (ref 1.6–2.6)
MCHC RBC-ENTMCNC: 30.5 PG — SIGNIFICANT CHANGE UP (ref 27–34)
MCHC RBC-ENTMCNC: 33.2 % — SIGNIFICANT CHANGE UP (ref 32–36)
MCV RBC AUTO: 92 FL — SIGNIFICANT CHANGE UP (ref 80–100)
PHOSPHATE SERPL-MCNC: 3.3 MG/DL — SIGNIFICANT CHANGE UP (ref 2.5–4.5)
PLATELET # BLD AUTO: 301 K/UL — SIGNIFICANT CHANGE UP (ref 150–400)
PMV BLD: 9.2 FL — SIGNIFICANT CHANGE UP (ref 7–13)
POTASSIUM SERPL-MCNC: 4 MMOL/L — SIGNIFICANT CHANGE UP (ref 3.5–5.3)
POTASSIUM SERPL-SCNC: 4 MMOL/L — SIGNIFICANT CHANGE UP (ref 3.5–5.3)
RBC # BLD: 4.1 M/UL — SIGNIFICANT CHANGE UP (ref 3.8–5.2)
RBC # FLD: 12.9 % — SIGNIFICANT CHANGE UP (ref 10.3–14.5)
SODIUM SERPL-SCNC: 135 MMOL/L — SIGNIFICANT CHANGE UP (ref 135–145)
WBC # BLD: 13.15 K/UL — HIGH (ref 3.8–10.5)
WBC # FLD AUTO: 13.15 K/UL — HIGH (ref 3.8–10.5)

## 2017-06-09 PROCEDURE — 93010 ELECTROCARDIOGRAM REPORT: CPT

## 2017-06-09 RX ORDER — MAGNESIUM SULFATE 500 MG/ML
1 VIAL (ML) INJECTION ONCE
Qty: 0 | Refills: 0 | Status: COMPLETED | OUTPATIENT
Start: 2017-06-09 | End: 2017-06-09

## 2017-06-09 RX ORDER — SPIRONOLACTONE 25 MG/1
12.5 TABLET, FILM COATED ORAL DAILY
Qty: 0 | Refills: 0 | Status: DISCONTINUED | OUTPATIENT
Start: 2017-06-09 | End: 2017-06-10

## 2017-06-09 RX ADMIN — Medication 1: at 08:19

## 2017-06-09 RX ADMIN — Medication 80 MILLIGRAM(S): at 22:02

## 2017-06-09 RX ADMIN — CARVEDILOL PHOSPHATE 3.12 MILLIGRAM(S): 80 CAPSULE, EXTENDED RELEASE ORAL at 18:16

## 2017-06-09 RX ADMIN — CLOPIDOGREL BISULFATE 75 MILLIGRAM(S): 75 TABLET, FILM COATED ORAL at 12:11

## 2017-06-09 RX ADMIN — Medication 100 GRAM(S): at 08:06

## 2017-06-09 RX ADMIN — SPIRONOLACTONE 12.5 MILLIGRAM(S): 25 TABLET, FILM COATED ORAL at 14:05

## 2017-06-09 RX ADMIN — Medication 3: at 12:12

## 2017-06-09 RX ADMIN — CARVEDILOL PHOSPHATE 3.12 MILLIGRAM(S): 80 CAPSULE, EXTENDED RELEASE ORAL at 05:48

## 2017-06-09 RX ADMIN — Medication 81 MILLIGRAM(S): at 12:11

## 2017-06-09 NOTE — DISCHARGE NOTE ADULT - MEDICATION SUMMARY - MEDICATIONS TO STOP TAKING
I will STOP taking the medications listed below when I get home from the hospital:    Procardia XL 30 mg oral tablet, extended release  -- 1 tab(s) by mouth once a day

## 2017-06-09 NOTE — DISCHARGE NOTE ADULT - SECONDARY DIAGNOSIS.
Coronary artery disease, occlusive Essential hypertension Type 2 diabetes mellitus without complication, without long-term current use of insulin

## 2017-06-09 NOTE — PHYSICAL THERAPY INITIAL EVALUATION ADULT - PERTINENT HX OF CURRENT PROBLEM, REHAB EVAL
77 y/o F with PMH NIDDM (A1C 6.6%), HTN, HLD referred from PCP's office for EKG changes in setting of chest pressure 2 days PTA, EKG shows new Q waves and DHAVAL in I, aVF, V2, cardiac enzymes elevated.

## 2017-06-09 NOTE — DISCHARGE NOTE ADULT - HOME CARE AGENCY
[facility] [phone].  Nurse to visit on the day after discharge.  Other appropriate services to be arranged thereafter.

## 2017-06-09 NOTE — DISCHARGE NOTE ADULT - CARE PLAN
Principal Discharge DX:	Cardiomyopathy, nonischemic  Goal:	Compliance with medications, preservation of heart function  Instructions for follow-up, activity and diet:	You were admitted to the hospital for an abnormal EKG that was detected several days after you experienced some chest pain. Though some lab tests when you came into the ER were concerning for a possible heart attack, you were taken to the cath lab where findings determined this was unlikely to have occurred. Though there was a significant blockage in one artery that supplies blood to your heart, the remaining arteries were clear. 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 While sometimes the stressor is obvious and can be either physical or emotional, such as a significant illness or death in one's family, there are other times when the underlying cause is never found. Sometimes the heart failure is transient and improves, while other times the heart remains weak. This condition can cause EKG and lab findings that can mimic a heart attack, as occurred in your case. The treatment for Tokotsubo's is like that for all patients with heart failure, and requires that you take a number of medications every single day. The first medication is Valsartan, which you were already taking at home and should continue to take as you were before admission. You also have been given two new medications called Coreg and Aldactone. Please take these as prescribed. Please also STOP taking Procardia as this could slow your heart rate too much if taken with Coreg. Dr. Tadeo, the cardiologist who treated you while you were here, would like to see you in follow up in 1-2 weeks. Please make an appointment with her - her contact information for clinic is in this paperwork.  Secondary Diagnosis:	Coronary artery disease, occlusive  Goal:	Prevention of heart attack and worsening heart function  Instructions for follow-up, activity and diet:	Though you did not have a heart attack, the cath lab images did show that you had a significant blockage in a lesser important artery that supplies your heart called the diagonal branch. Because this is evidence that there are cholesterol deposits in that artery, there are some medications that you should now take to prevent worsening cholesterol build up. You have been given prescriptions for three medications that you should take every day - Plavix, baby aspirin and pravastatin. Unlike Lipitor (atorvastatin), which you had problems with in the past, pravastatin is less likely to have that effect. As mentioned above, please follow up with Dr. Tadeo in 1-2 weeks and be mindful to follow a diabetic and low fat diet.  Secondary Diagnosis:	Essential hypertension  Goal:	Blood pressure < 150/90  Instructions for follow-up, activity and diet:	Please continue taking Valsartan as you were prior to admission and also begin taking Coreg as discussed above. Please stop taking Procardia. Follow up with your primary care provider in accordance with your typical routine.  Secondary Diagnosis:	Type 2 diabetes mellitus without complication, without long-term current use of insulin  Goal:	Hemoglobin a1c < 7.5%  Instructions for follow-up, activity and diet:	Your hemoglobin a1c on this admission was 6.6%, which is below the goal for your age. This is excellent and indicates your diabetes is under good control. Please continue taking your diabetes medications in the same way you were before admission to the hospital and follow up with your primary care provider for ongoing management.

## 2017-06-09 NOTE — PROGRESS NOTE ADULT - PROBLEM SELECTOR PLAN 4
--improving  --AST elevated likely 2/2 recent cardiac ischemia especially in light of normal ALT  --trend hepatic function panel daily

## 2017-06-09 NOTE — PROGRESS NOTE ADULT - ATTENDING COMMENTS
Patient doing well overnight. 76 year old woman with likely Takotsubo CM.  - cont aspirin  -cont losartan and coreg for severe LV dysfunction  -addition of aldactone 12.5 mg daily  -dc planning likely tomorrow if remains clinically stable.
Patient seen and examined. Post Cath with only small diag disease. Echo reviewed with severe LV dysfunction from mod to distal segments. Does not appear fluid overloaded at present  -dc heparin  -dc brinlinta  -can continue asa 81 mg QD, plavix 75 mg qd  -start coreg 3.125 mg BID  -eventual ace  -no need for diuresis at present

## 2017-06-09 NOTE — DISCHARGE NOTE ADULT - HOSPITAL COURSE
HPI:  77 y/o F with PMH NIDDM (A1C 6.6%), HTN, HLD referred from PCP's office for tachycardia. The patient states that on Monday, 2 days PTA, she felt chest pressure while at rest for half the day. She denies associated N/V, diaphoresis, SOB. She states that she did arm exercises and 2 baby aspirin, which slightly relieved the pain. She states that since this time she has been at her baseline state of health, including ambulating independently with a cane. Today, she had an appointment with her PCP for a routine follow up. An EKG performed in the office showed new q waves with 1mm Q waves, DHAVAL in I, aVF and V2.     In the ED, VS were T 98.1, -110, -119/68-71, RR 16, 98% RA. She was given aspirin 325, ticagrelor 180, and lasix 20 IVP x 1. Labs were of note for significantly elevated cardiac enzymes , CKMB 41.34, Trop 1.06. Also AST elevated 89, hyponatremia 126 and leukocytosis 19.22. She was admitted to the CCU for likely late presentation MI. On presentation to the CCU, the patient denied f/c, chest pain or pressure, sob, diaphoresis, N/V.     Hospital Course:  Patient was admitted directly to CCU for what was believed to be most likely a late presentation MI and was not sent emergently for cath as she was noted to be asymptomatic chest pain free at time of presentation. She was started on heparin gtt for persistently elevated Pauline as well as on daily aspirin and brillinta while awaiting catheterization The day after admission, she went for cardiac cath, which showed findings consistent with Takotsubo cardiomyopathy, which was later confirmed on formal transthoracic echocardiogram (see below). She was a 99% occlusion of the diagonal vessel but otherwise no blockages were found. No stents were placed. After cath, she was started on coreg and restarted on her home dose valsartan. Heparin gtt was discontinued, as was brillinta, and she was started on plavix for coronary artery disease in diagonal branch. As she had previously been intolerant of lipitor due to transaminitis, she was started on pravastatin 80mg as therapeutic equivalent to lipitor 40mg. Prior to discharge, she was started on low dose aldactone 12.5mg daily.     Of note, patient did on admission have both leukocytosis and elevated serum transaminases (AST > ALT) which were believed most likely in the setting of cardiac stress. She had no clinical signs or symptoms of infection and was monitored off antibiotics. Both her white blood cell count and transaminase levels returned to normal by time of discharge without any intervention. Furthermore, she did have hyponatremia (126 on admission) in setting of euvolemic exam, thought to be SIADH due to acute stress. This was supported by resolution of serum sodium to normal after admission without any acute intervention or need to workup.    Physical therapy evaluated her and recommended discharge home with physical therapy and rolling walker. She was discharged stable and in good condition on 6/10/17 with instructions to follow up with Dr. Tadeo in cardiology in 1-2 weeks.

## 2017-06-09 NOTE — DISCHARGE NOTE ADULT - MEDICATION SUMMARY - MEDICATIONS TO TAKE
I will START or STAY ON the medications listed below when I get home from the hospital:    Aldactone 25 mg oral tablet  -- 0.5 tab(s) by mouth once a day  -- Indication: For Cardiomyopathy, nonischemic    aspirin 81 mg oral delayed release tablet  -- 1 tab(s) by mouth once a day  -- Indication: For Coronary artery disease    valsartan 160 mg oral tablet  -- 1 tab(s) by mouth once a day  -- Indication: For Cardiomyopathy, nonischemic    metFORMIN 500 mg oral tablet  -- 1 tab(s) by mouth 2 times a day  -- Indication: For Diabetes    glipiZIDE 5 mg oral tablet  -- 1 tab(s) by mouth 2 times a day  -- Indication: For Diabetes    pravastatin 80 mg oral tablet  -- 1 tab(s) by mouth once a day (at bedtime)  -- Indication: For Coronary Artery Disese    Plavix 75 mg oral tablet  -- 1 tab(s) by mouth once a day  -- Indication: For Cardiomyopathy, nonischemic    Coreg 3.125 mg oral tablet  -- 1 tab(s) by mouth every 12 hours  -- Indication: For Cardiomyopathy, nonischemic    Vitamin D3 50,000 intl units oral capsule  -- 1 cap(s) by mouth once a week  -- Indication: For Vitamin supplement

## 2017-06-09 NOTE — DISCHARGE NOTE ADULT - PLAN OF CARE
Compliance with medications, preservation of heart function You were admitted to the hospital for an abnormal EKG that was detected several days after you experienced some chest pain. Though some lab tests when you came into the ER were concerning for a possible heart attack, you were taken to the cath lab where findings determined this was unlikely to have occurred. Though there was a significant blockage in one artery that supplies blood to your heart, the remaining arteries were clear. 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 While sometimes the stressor is obvious and can be either physical or emotional, such as a significant illness or death in one's family, there are other times when the underlying cause is never found. Sometimes the heart failure is transient and improves, while other times the heart remains weak. This condition can cause EKG and lab findings that can mimic a heart attack, as occurred in your case. The treatment for Tokotsubo's is like that for all patients with heart failure, and requires that you take a number of medications every single day. The first medication is Valsartan, which you were already taking at home and should continue to take as you were before admission. You also have been given two new medications called Coreg and Aldactone. Please take these as prescribed. Please also STOP taking Procardia as this could slow your heart rate too much if taken with Coreg. Dr. Tadeo, the cardiologist who treated you while you were here, would like to see you in follow up in 1-2 weeks. Please make an appointment with her - her contact information for clinic is in this paperwork. Prevention of heart attack and worsening heart function Though you did not have a heart attack, the cath lab images did show that you had a significant blockage in a lesser important artery that supplies your heart called the diagonal branch. Because this is evidence that there are cholesterol deposits in that artery, there are some medications that you should now take to prevent worsening cholesterol build up. You have been given prescriptions for three medications that you should take every day - Plavix, baby aspirin and pravastatin. Unlike Lipitor (atorvastatin), which you had problems with in the past, pravastatin is less likely to have that effect. As mentioned above, please follow up with Dr. Tadeo in 1-2 weeks and be mindful to follow a diabetic and low fat diet. Blood pressure < 150/90 Please continue taking Valsartan as you were prior to admission and also begin taking Coreg as discussed above. Please stop taking Procardia. Follow up with your primary care provider in accordance with your typical routine. Hemoglobin a1c < 7.5% Your hemoglobin a1c on this admission was 6.6%, which is below the goal for your age. This is excellent and indicates your diabetes is under good control. Please continue taking your diabetes medications in the same way you were before admission to the hospital and follow up with your primary care provider for ongoing management.

## 2017-06-09 NOTE — DISCHARGE NOTE ADULT - CARE PROVIDER_API CALL
Sara Tadeo (MD), Cardiovascular Disease  [address] [place] [address]  Phone: [phone]  Fax: [phone]

## 2017-06-09 NOTE — PHYSICAL THERAPY INITIAL EVALUATION ADULT - GENERAL OBSERVATIONS, REHAB EVAL
Patient received sitting OOB in a recliner, (+) tele monitor, in NAD BP 99/50 HR 81 no c/o light headedness or pain ,SHAKEEL La with the patient t/o the PT evaluation.

## 2017-06-09 NOTE — DISCHARGE NOTE ADULT - OTHER SIGNIFICANT FINDINGS
TTE:  1. Mitral annular calcification, otherwise normal mitral  valve. Minimal mitral regurgitation.  2. Severe segmental left ventricular systolic dysfunction.  Hypokinesis of the apex, mid to distal septum, and mid to  distal anterior wall.  Endocardial visualization enhanced  with intravenous injection of echo contrast (Definity).  No  LV thrombus seen.  3. The right ventricle is not well visualized; grossly  normal right ventricular systolic function.

## 2017-06-09 NOTE — PROGRESS NOTE ADULT - SUBJECTIVE AND OBJECTIVE BOX
CCU Progress Note    S/24 Events: No acute events overnight. Feels well without complaint.    O:  T(C): 36.8, Max: 37.2 ( @ 16:00)  HR: 75 (75 - 114)  BP: 93/50 (89/49 - 123/52)  RR: 19 (17 - 26)  SpO2: 95% (95% - 100%)  Wt(kg): --  Daily     Daily Weight in k.8 (2017 05:00)    I & Os for current day (as of  @ 07:22)  =============================================  IN: 566 ml / OUT: 1350 ml / NET: -784 ml      General: alert, NAD, non-toxic HEENT: clear conjunctiva, no oral lesions, MMM Respiratory: CTAB, no c/w/r Cardiovascular: RRR. +S1/S2 wnl. no m/r/g , no JVD Gastrointestinal: soft, non-distended, non-tender, NABS Neuro: AOX3, grossly non-focal Extremities: no peripheral edema 	      Labs:                        12.5   13.15 )-----------( 301      ( 2017 05:45 )             37.7         135  |  97<L>  |  25<H>  ----------------------------<  140<H>  4.0   |  22  |  0.84    Ca    9.0      2017 05:45  Phos  3.3     -  Mg     1.8         TPro  7.1  /  Alb  3.7  /  TBili  0.8  /  DBili  x   /  AST  60<H>  /  ALT  20  /  AlkPhos  62  08    PT/INR - ( 2017 13:30 )   PT: 10.9 SEC;   INR: 0.97          PTT - ( 2017 05:35 )  PTT:56.4 SEC  CARDIAC MARKERS ( 2017 13:25 )  x     / 1.52 ng/mL / x     / x     / x      CARDIAC MARKERS ( 2017 05:35 )  x     / 1.57 ng/mL / 387 u/L / 18.37 ng/mL / x      CARDIAC MARKERS ( 2017 22:00 )  x     / 1.46 ng/mL / 465 u/L / x     / x      CARDIAC MARKERS ( 2017 13:30 )  x     / 1.06 ng/mL / 635 u/L / 41.34 ng/mL / x          Echo:  1. Mitral annular calcification, otherwise normal mitral valve. Minimal mitral regurgitation.  2. Severe segmental left ventricular systolic dysfunction. Hypokinesis of the apex, mid to distal septum, and mid to distal anterior wall.  Endocardial visualization enhanced with intravenous injection of echo contrast (Definity).  No LV thrombus seen.  3. The right ventricle is not well visualized; grossly normal right ventricular systolic function.      Meds:  MEDICATIONS  (STANDING):  aspirin enteric coated 81milliGRAM(s) Oral daily  insulin lispro (HumaLOG) corrective regimen sliding scale  SubCutaneous three times a day before meals  insulin lispro (HumaLOG) corrective regimen sliding scale  SubCutaneous at bedtime  dextrose 50% Injectable 12.5Gram(s) IV Push once  dextrose 50% Injectable 25Gram(s) IV Push once  dextrose 50% Injectable 25Gram(s) IV Push once  valsartan 160milliGRAM(s) Oral daily  pravastatin 80milliGRAM(s) Oral at bedtime  clopidogrel Tablet 75milliGRAM(s) Oral daily  carvedilol 3.125milliGRAM(s) Oral every 12 hours  magnesium sulfate  IVPB 1Gram(s) IV Intermittent once      Adrianna Rodriguez M.D. PGY-1

## 2017-06-09 NOTE — PROGRESS NOTE ADULT - PROBLEM SELECTOR PLAN 3
--improving  --patient with elevated WBC 19, improved to 13 without intervention this AM.  --likely in the acute setting of recent MI. Low suspicion for infection as patient afebrile without leukocytosis, no clinical symptoms of infectious source  --monitoring off abx, trending with qd CBC

## 2017-06-09 NOTE — DISCHARGE NOTE ADULT - INSTRUCTIONS
Monitor right wrist for signs and symptoms of infection (redness, swelling, drainage) Notify doctor if any signs noted.

## 2017-06-09 NOTE — PROGRESS NOTE ADULT - PROBLEM SELECTOR PLAN 5
--resolved   --euvolemic on exam, most likely SIADH in setting of acute stress state as patient improved without acute intervention  --continue to trend with qd BMP. If Na plateaus or worsens will send urine Na, urine/serum osms and serum uric acid to determine etiology

## 2017-06-09 NOTE — PROGRESS NOTE ADULT - PROBLEM SELECTOR PLAN 1
--pt found to have new Q waves and ST elevations in 1, avF and V2 on EKG at PCP's office after having experienced CP 2 days prior with significant elevation of Pauline significantly elevated on admission  --s/p cardiac cath yesterday which showed 99% diagonal disease but otherwise no significant occlusions, TTE pathognomonic for Tokotsubos CM which is the most likely cause for EKG changes and elevated Pauline. No stents required  --continue Plavix given diagonal disease, continue ASA, pravastatin (as patient had elevated transaminases on lipitor in past), Coreg, Valsartan  --uptitrate Coreg today as HR allows  --given severe global systolic dysfunction and T2DM patient will benefit from aldactone in future  --Monitor for recurrence of chest pain or pressure. Nitroglycerin sublingual PRN, will expedite cath if patient becomes symptomatic   --continuous cardiac monitoring for arrhythmias --pt found to have new Q waves and ST elevations in 1, avF and V2 on EKG at PCP's office after having experienced CP 2 days prior with significant elevation of Pauline significantly elevated on admission  --s/p cardiac cath yesterday which showed 99% diagonal disease but otherwise no significant occlusions, TTE pathognomonic for Tokotsubos CM which is the most likely cause for EKG changes and elevated Pauline. No stents required  --continue Plavix given diagonal disease, continue ASA, pravastatin (as patient had elevated transaminases on lipitor in past), Coreg, Valsartan  --no need for maintenance PO diuresis based on clinical assessment, if needed will consider 20mg PO qd   --given severe global systolic dysfunction and T2DM patient will benefit from aldactone. Start 12.5mg qd today  --Monitor for recurrence of chest pain or pressure. Nitroglycerin sublingual PRN, will expedite cath if patient becomes symptomatic   --continuous cardiac monitoring for arrhythmias

## 2017-06-09 NOTE — PROGRESS NOTE ADULT - ASSESSMENT
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

## 2017-06-10 VITALS — SYSTOLIC BLOOD PRESSURE: 105 MMHG | OXYGEN SATURATION: 96 % | HEART RATE: 77 BPM | DIASTOLIC BLOOD PRESSURE: 61 MMHG

## 2017-06-10 LAB
ALBUMIN SERPL ELPH-MCNC: 3.3 G/DL — SIGNIFICANT CHANGE UP (ref 3.3–5)
ALP SERPL-CCNC: 69 U/L — SIGNIFICANT CHANGE UP (ref 40–120)
ALT FLD-CCNC: 17 U/L — SIGNIFICANT CHANGE UP (ref 4–33)
AST SERPL-CCNC: 26 U/L — SIGNIFICANT CHANGE UP (ref 4–32)
BILIRUB SERPL-MCNC: 0.4 MG/DL — SIGNIFICANT CHANGE UP (ref 0.2–1.2)
BUN SERPL-MCNC: 32 MG/DL — HIGH (ref 7–23)
CALCIUM SERPL-MCNC: 8.7 MG/DL — SIGNIFICANT CHANGE UP (ref 8.4–10.5)
CHLORIDE SERPL-SCNC: 98 MMOL/L — SIGNIFICANT CHANGE UP (ref 98–107)
CO2 SERPL-SCNC: 23 MMOL/L — SIGNIFICANT CHANGE UP (ref 22–31)
CREAT SERPL-MCNC: 1.08 MG/DL — SIGNIFICANT CHANGE UP (ref 0.5–1.3)
GLUCOSE SERPL-MCNC: 163 MG/DL — HIGH (ref 70–99)
HCT VFR BLD CALC: 35.8 % — SIGNIFICANT CHANGE UP (ref 34.5–45)
HGB BLD-MCNC: 12 G/DL — SIGNIFICANT CHANGE UP (ref 11.5–15.5)
MAGNESIUM SERPL-MCNC: 2.1 MG/DL — SIGNIFICANT CHANGE UP (ref 1.6–2.6)
MCHC RBC-ENTMCNC: 30.5 PG — SIGNIFICANT CHANGE UP (ref 27–34)
MCHC RBC-ENTMCNC: 33.5 % — SIGNIFICANT CHANGE UP (ref 32–36)
MCV RBC AUTO: 90.9 FL — SIGNIFICANT CHANGE UP (ref 80–100)
PHOSPHATE SERPL-MCNC: 3.6 MG/DL — SIGNIFICANT CHANGE UP (ref 2.5–4.5)
PLATELET # BLD AUTO: 321 K/UL — SIGNIFICANT CHANGE UP (ref 150–400)
PMV BLD: 9.1 FL — SIGNIFICANT CHANGE UP (ref 7–13)
POTASSIUM SERPL-MCNC: 4.5 MMOL/L — SIGNIFICANT CHANGE UP (ref 3.5–5.3)
POTASSIUM SERPL-SCNC: 4.5 MMOL/L — SIGNIFICANT CHANGE UP (ref 3.5–5.3)
PROT SERPL-MCNC: 6.6 G/DL — SIGNIFICANT CHANGE UP (ref 6–8.3)
RBC # BLD: 3.94 M/UL — SIGNIFICANT CHANGE UP (ref 3.8–5.2)
RBC # FLD: 12.7 % — SIGNIFICANT CHANGE UP (ref 10.3–14.5)
SODIUM SERPL-SCNC: 134 MMOL/L — LOW (ref 135–145)
WBC # BLD: 11.73 K/UL — HIGH (ref 3.8–10.5)
WBC # FLD AUTO: 11.73 K/UL — HIGH (ref 3.8–10.5)

## 2017-06-10 PROCEDURE — 99239 HOSP IP/OBS DSCHRG MGMT >30: CPT

## 2017-06-10 PROCEDURE — 93010 ELECTROCARDIOGRAM REPORT: CPT

## 2017-06-10 RX ORDER — ASPIRIN/CALCIUM CARB/MAGNESIUM 324 MG
1 TABLET ORAL
Qty: 30 | Refills: 2 | OUTPATIENT
Start: 2017-06-10 | End: 2017-09-07

## 2017-06-10 RX ORDER — CLOPIDOGREL BISULFATE 75 MG/1
1 TABLET, FILM COATED ORAL
Qty: 30 | Refills: 0 | OUTPATIENT
Start: 2017-06-10 | End: 2017-07-10

## 2017-06-10 RX ORDER — NIFEDIPINE 30 MG
1 TABLET, EXTENDED RELEASE 24 HR ORAL
Qty: 0 | Refills: 0 | COMMUNITY

## 2017-06-10 RX ORDER — SPIRONOLACTONE 25 MG/1
0.5 TABLET, FILM COATED ORAL
Qty: 15 | Refills: 0 | OUTPATIENT
Start: 2017-06-10 | End: 2017-07-10

## 2017-06-10 RX ORDER — CARVEDILOL PHOSPHATE 80 MG/1
1 CAPSULE, EXTENDED RELEASE ORAL
Qty: 60 | Refills: 0 | OUTPATIENT
Start: 2017-06-10 | End: 2017-07-10

## 2017-06-10 RX ADMIN — Medication 81 MILLIGRAM(S): at 12:39

## 2017-06-10 RX ADMIN — Medication 3: at 12:39

## 2017-06-10 RX ADMIN — CARVEDILOL PHOSPHATE 3.12 MILLIGRAM(S): 80 CAPSULE, EXTENDED RELEASE ORAL at 06:05

## 2017-06-10 RX ADMIN — Medication 1: at 08:46

## 2017-06-10 RX ADMIN — CLOPIDOGREL BISULFATE 75 MILLIGRAM(S): 75 TABLET, FILM COATED ORAL at 12:39

## 2017-06-10 NOTE — PROGRESS NOTE ADULT - PROBLEM SELECTOR PLAN 7
-lipid profile 202/73/93/105  -c/w pravastatin as patient with elevated transaminases on Lipitor in past

## 2017-06-10 NOTE — PROGRESS NOTE ADULT - SUBJECTIVE AND OBJECTIVE BOX
CCU Progress Note    S/24 Events: No acute events overnight. Feels well this AM without complaint. Denies CP, SOB, DIAL, PND. PT eval completed yesterday, recs to d/c home with PT.    O:  T(C): 36.7, Max: 36.7 ( @ 16:00)  HR: 67 (64 - 99)  BP: 96/42 (81/32 - 109/48)  RR: 20 (16 - 20)  SpO2: 99% (95% - 99%)  Wt(kg): --  Daily     Daily Weight in k.1 (10 Con 2017 06:02)    I & Os for current day (as of 06-10 @ 07:55)  =============================================  IN: 850 ml / OUT: 1500 ml / NET: -650 ml      General: alert, NAD, non-toxic  HEENT: clear conjunctiva, no oral lesions, MMM  Respiratory: CTAB, no c/w/r  Cardiovascular: RRR. +S1/S2 wnl. no m/r/g , no JVD  Gastrointestinal: soft, non-distended, non-tender, NABS  Neuro: AOX3, grossly non-focal  Extremities: no peripheral edema    Labs:                        12.0   11.73 )-----------( 321      ( 10 Con 2017 05:30 )             35.8     06-10    134<L>  |  98  |  32<H>  ----------------------------<  163<H>  4.5   |  23  |  1.08    Ca    8.7      10 Con 2017 05:30  Phos  3.6     06-10  Mg     2.1     06-10    TPro  6.6  /  Alb  3.3  /  TBili  0.4  /  DBili  x   /  AST  26  /  ALT  17  /  AlkPhos  69  06-10      CARDIAC MARKERS ( 2017 13:25 )  x     / 1.52 ng/mL / x     / x     / x              Meds:  MEDICATIONS  (STANDING):  aspirin enteric coated 81milliGRAM(s) Oral daily  insulin lispro (HumaLOG) corrective regimen sliding scale  SubCutaneous three times a day before meals  insulin lispro (HumaLOG) corrective regimen sliding scale  SubCutaneous at bedtime  dextrose 50% Injectable 12.5Gram(s) IV Push once  dextrose 50% Injectable 25Gram(s) IV Push once  dextrose 50% Injectable 25Gram(s) IV Push once  valsartan 160milliGRAM(s) Oral daily  pravastatin 80milliGRAM(s) Oral at bedtime  clopidogrel Tablet 75milliGRAM(s) Oral daily  carvedilol 3.125milliGRAM(s) Oral every 12 hours  spironolactone 12.5milliGRAM(s) Oral daily      A/P:        Sagar Chaidez, PGY-5  Cardiology

## 2017-06-10 NOTE — PROGRESS NOTE ADULT - PROBLEM SELECTOR PLAN 1
--pt found to have new Q waves and ST elevations in 1, avF and V2 on EKG at PCP's office after having experienced CP 2 days prior with significant elevation of Pauline significantly elevated on admission  --s/p cardiac cath on 6/8 which showed 99% diagonal disease but otherwise no significant occlusions; TTE pathognomonic for Tokotsubos CM which is most likely cause for EKG changes and elevated Pauline. No stents required  --continue Plavix, ASA and atorvastatin given diagonal vessel disease.  --continue Coreg, Valsartan, aldactone   --monitor for recurrence of chest pain or pressure. Nitroglycerin sublingual PRN, will expedite cath if patient becomes symptomatic   --continuous cardiac monitoring for arrhythmias

## 2017-06-10 NOTE — PROGRESS NOTE ADULT - PROBLEM SELECTOR PLAN 3
--near resolved  --likely in the acute setting of recent MI. Low suspicion for infection as patient remains afebrile, no clinical symptoms of infectious source  --monitoring off abx, trending with qd CBC

## 2017-06-10 NOTE — PROGRESS NOTE ADULT - PROBLEM SELECTOR PLAN 5
--resolved   --euvolemic on exam, most likely SIADH in setting of acute stress state as patient improved without acute intervention

## 2017-06-10 NOTE — PROGRESS NOTE ADULT - ASSESSMENT
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

## 2022-04-05 NOTE — ED ADULT NURSE NOTE - PAIN RATING/NUMBER SCALE (0-10): REST
Spoke to patient, verified . Reviewed HSV results and expectations going forward. If outbreak occurs, will treat. If frequent outbreaks occur, consider suppressive therapy. No history of oral or genital herpes outbreak. 0

## 2023-07-27 NOTE — ED PROVIDER NOTE - CARE PLAN
Principal Discharge DX:	Chest pain oral Principal Discharge DX:	STEMI (ST elevation myocardial infarction)

## 2024-12-20 NOTE — CHART NOTE - NSCHARTNOTEFT_GEN_A_CORE
R radial band removed, good hemostasis noted. R radial pulse 2+ palp. Good ROJM noted, no evidence of hematoma, capillary refill < 3 sec.  DSD in place. MEDICATION/TREATMENT NON-COMPLIANCE